# Patient Record
(demographics unavailable — no encounter records)

---

## 2025-04-22 NOTE — HEALTH RISK ASSESSMENT
[Very Good] : ~his/her~  mood as very good [No] : No [No falls in past year] : Patient reported no falls in the past year [0] : 2) Feeling down, depressed, or hopeless: Not at all (0) [PHQ-2 Negative - No further assessment needed] : PHQ-2 Negative - No further assessment needed [Never] : Never [Patient reported PAP Smear was normal] : Patient reported PAP Smear was normal [HIV test declined] : HIV test declined [Hepatitis C test declined] : Hepatitis C test declined [Alone] : lives alone [Employed] : employed [Feels Safe at Home] : Feels safe at home [Fully functional (bathing, dressing, toileting, transferring, walking, feeding)] : Fully functional (bathing, dressing, toileting, transferring, walking, feeding) [Fully functional (using the telephone, shopping, preparing meals, housekeeping, doing laundry, using] : Fully functional and needs no help or supervision to perform IADLs (using the telephone, shopping, preparing meals, housekeeping, doing laundry, using transportation, managing medications and managing finances) [Seat Belt] :  uses seat belt [[id number]] : 0 [Reports changes in hearing] : Reports no changes in hearing [Reports changes in vision] : Reports no changes in vision [Reports changes in dental health] : Reports no changes in dental health [FreeTextEntry2] : nurse

## 2025-04-22 NOTE — HISTORY OF PRESENT ILLNESS
[FreeTextEntry1] : CPE [de-identified] : RAÚL SEWELL is a 26 year F with no significant PMHx presenting for CPE. 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  Diet/exercise: Doesnt get to exercise as much as when she was on days. 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 Social EtOh use, hookah socially. No tobacco or other drug use. Live by herself.  Sexually active/Last menstrual period: Pap smear UTD. GYN at [facility]. Gets periods on off-week LMP 4/7/2025. Amenable to STD testing.

## 2025-04-22 NOTE — ASSESSMENT
[Vaccines Reviewed] : Immunizations reviewed today. Please see immunization details in the vaccine log within the immunization flowsheet.  [FreeTextEntry1] : HEALTH CARE MAINTENANCE - Influenza vaccine: UTD - Covid vaccine: UTD - HIV: TODAY - HEP C: TODAY - HBV: UTD - TDAP: UTD - Pap smear: UTD 2024  RTC 1 year or earlier prn